# Patient Record
Sex: FEMALE | Race: BLACK OR AFRICAN AMERICAN | NOT HISPANIC OR LATINO | URBAN - METROPOLITAN AREA
[De-identification: names, ages, dates, MRNs, and addresses within clinical notes are randomized per-mention and may not be internally consistent; named-entity substitution may affect disease eponyms.]

---

## 2021-06-05 ENCOUNTER — EMERGENCY (EMERGENCY)
Facility: HOSPITAL | Age: 31
LOS: 0 days | Discharge: HOME | End: 2021-06-05
Attending: EMERGENCY MEDICINE | Admitting: EMERGENCY MEDICINE
Payer: MEDICAID

## 2021-06-05 VITALS
DIASTOLIC BLOOD PRESSURE: 77 MMHG | HEART RATE: 83 BPM | OXYGEN SATURATION: 100 % | HEIGHT: 68 IN | SYSTOLIC BLOOD PRESSURE: 126 MMHG | TEMPERATURE: 98 F | WEIGHT: 179.9 LBS | RESPIRATION RATE: 18 BRPM

## 2021-06-05 DIAGNOSIS — N89.8 OTHER SPECIFIED NONINFLAMMATORY DISORDERS OF VAGINA: ICD-10-CM

## 2021-06-05 LAB — HIV 1 & 2 AB SERPL IA.RAPID: SIGNIFICANT CHANGE UP

## 2021-06-05 PROCEDURE — 99284 EMERGENCY DEPT VISIT MOD MDM: CPT

## 2021-06-05 RX ORDER — CEFTRIAXONE 500 MG/1
500 INJECTION, POWDER, FOR SOLUTION INTRAMUSCULAR; INTRAVENOUS ONCE
Refills: 0 | Status: COMPLETED | OUTPATIENT
Start: 2021-06-05 | End: 2021-06-05

## 2021-06-05 RX ORDER — AZITHROMYCIN 500 MG/1
1 TABLET, FILM COATED ORAL ONCE
Refills: 0 | Status: COMPLETED | OUTPATIENT
Start: 2021-06-05 | End: 2021-06-05

## 2021-06-05 RX ADMIN — CEFTRIAXONE 500 MILLIGRAM(S): 500 INJECTION, POWDER, FOR SOLUTION INTRAMUSCULAR; INTRAVENOUS at 11:59

## 2021-06-05 RX ADMIN — AZITHROMYCIN 1 GRAM(S): 500 TABLET, FILM COATED ORAL at 11:59

## 2021-06-05 NOTE — ED PROVIDER NOTE - NSFOLLOWUPINSTRUCTIONS_ED_ALL_ED_FT
VAGINAL DISCHARGE - AfterCare(R) Instructions(ER/ED)     Vaginal Discharge    WHAT YOU NEED TO KNOW:    Vaginal discharge is normal. It is usually clear or white and odorless. Vaginal discharge is your body's way of cleaning your vagina so it is healthy. Irritation, itching, burning, or a change in the amount, smell, or color may indicate a problem.    DISCHARGE INSTRUCTIONS:    Contact your healthcare provider or gynecologist if:     You have swelling, burning, itching, or irritation in or around your vagina.      You have an increase in the amount of discharge.      The color or smell of your discharge changes.      Your discharge looks similar to cottage cheese.      Your discharge is bloody and it is not your monthly period.      You have pain during sexual intercourse.      You have trouble urinating, or you urinate often and with urgency.      You have abdominal pain or cramps.      You have a fever or chills.      You have low back pain or side pain.      You have questions or concerns about your condition or care.    Keep your vagina healthy:     Always wipe from front to back after you use the toilet. This prevents spreading bacteria from your rectal area into your vagina.       Clean in and around your vagina with mild soap and warm water each day. Gently dry the area after washing. Do not use hot tubs. The heat and moisture from hot tubs can increase your risk for another yeast infection.      Do not wear tight-fitting clothes or undergarments for long periods. Wear cotton underwear during the day. Cotton helps keep your genital area dry and does not hold in warmth or moisture. Do not wear underwear at night.      Change your laundry soap or fabric softener if you think it is irritating your skin.      Do not douche or use feminine hygiene sprays or bubble bath. Do not use pads or tampons that are scented, or colored or perfumed toilet paper.      Ask your healthcare provider about birth control options if necessary. Condoms have latex and diaphragms have gel that kill sperm. Both of these may irritate your genital area.    Follow up with your healthcare provider as directed: Write down your questions so you remember to ask them during your visits.        © Copyright [employer] 2019 All illustrations and images included in CareNotes are the copyrighted property of [employer] or [employer].

## 2021-06-05 NOTE — ED PROVIDER NOTE - CLINICAL SUMMARY MEDICAL DECISION MAKING FREE TEXT BOX
29 yo female without any significant PMH her " to be checked by a doctor".  She just recently moved to  from [place] and would like to establish medical care here/.  She reports vaginal irritation/d/c after sexual activity with her boyfriend; doen not always use protection.  Denies any abnormal vaginal bleeding, pelvic or abdominal pain, no dysuria/frequency or urgency, no fever chills, flank pain or any other additional complaints.   Well-appearing well-nourished young female in  NAD, head AT/NC, PERRL, pink conjunctivae,  nml phonation , supple neck without midline spine ttp, nml work of breathing, lungs CTA b/l, equal air entry, RRR, well-perfused extremities, distal pulses intact, abdomen soft, NT/ND, BS present in all quadrants, no midline spine or CVA ttp, no leg edema or unilateral calf swelling, skin nml color and temp.   exam as documented by PA , A&Ox3, no focal neuro deficits, nml mood and affect.  Will treat for STI, advised to f/w in OB-GYN clinic to establish care there.  Strict return precautions given.  Patient verbalized understanding and is amenable with the plan. 31 yo female without any significant PMH her " to be checked by a doctor".  She just recently moved to  from [place] and would like to establish medical care here/.  She reports vaginal irritation/d/c after sexual activity with her boyfriend; doen not always use protection.  Denies any abnormal vaginal bleeding, pelvic or abdominal pain, no dysuria/frequency or urgency, no fever chills, flank pain or any other additional complaints.   Well-appearing well-nourished young female in  NAD, head AT/NC, PERRL, pink conjunctivae,  nml phonation , supple neck without midline spine ttp, nml work of breathing, lungs CTA b/l, equal air entry, RRR, well-perfused extremities, distal pulses intact, abdomen soft, NT/ND, BS present in all quadrants, no midline spine or CVA ttp, no leg edema or unilateral calf swelling, skin nml color and temp.   exam as documented by PA , A&Ox3, no focal neuro deficits, nml mood and affect.  Will treat for STI, advised to f/w in OB-GYN clinic to establish care there.  Strict return precautions given.  Patient verbalized understanding and is amenable with the plan..

## 2021-06-05 NOTE — ED PROVIDER NOTE - NS ED ROS FT
Constitutional: (-) fever, (-) chills  Eyes: (-) visual changes  ENT: (-) nasal congestions  Cardiovascular: (-) chest pain, (-) syncope  Respiratory: (-) cough, (-) shortness of breath, (-) dyspnea,   Gastrointestinal: (-) vomiting, (-) diarrhea, (-)nausea,  Musculoskeletal: (-) neck pain, (-) back pain, (-) joint pain,  Integumentary: (-) rash  Peripheral Vascular: (-) leg swelling  :  (-)dysuria,  (-) hematuria, (+) vaginal irritation   Allergic/Immunologic: (-) pruritus

## 2021-06-05 NOTE — ED PROVIDER NOTE - NSFOLLOWUPCLINICS_GEN_ALL_ED_FT
[facility]  OB/GYN  [address] [place] [address]  Phone: [phone]  Fax:   Follow Up Time: 1-3 Days

## 2021-06-05 NOTE — ED PROVIDER NOTE - PHYSICAL EXAMINATION
Physical Exam    Vital Signs: I have reviewed the initial vital signs.  Constitutional: well-nourished, appears stated age, no acute distress  Eyes: Conjunctiva pink, Sclera clear,  Cardiovascular: S1 and S2, regular rate, regular rhythm, well-perfused extremities, radial pulses equal and 2+  Respiratory: unlabored respiratory effort, clear to auscultation bilaterally no wheezing, rales and rhonchi  Gastrointestinal: soft, non-tender abdomen, no pulsatile mass, normal bowl sounds  : chaperoned with rn, no external lesions or vesicles, cervix with white d/c, no cmt.   Musculoskeletal: supple neck, no lower extremity edema, no midline tenderness  Integumentary: warm, dry, no rash  Neurologic: awake, alert, nvi

## 2021-06-05 NOTE — ED ADULT TRIAGE NOTE - PATIENT ON (OXYGEN DELIVERY METHOD)
From: Jaquelin Hou  To: Jess Varela MD  Sent: 1/19/2018 11:56 AM CST  Subject: BP readings    Dear Dr Varela, My BP readings are: 162/74 yesterday at 8 PM; 138/62 today at 11:30 AM. Maybe there is hope for me! I will start on the Lamictal extended release tonight. That sure was serendipitous that you should suggest that at my visit. THANK YOU! Thank you also for the therapeutic visit. Have you read the book \"My [place]\" by former [place] governor Reji Hicks about his experience with his wife's Alzheimer's disease?   I told you that Branden was doing okay. She's actually is doing good. Very motivated to do her exercises. Her mood and attitude are good too which makes for an enjoyable  for me. Her interests are reading and watching sports and hearing all the details of my experiences.   room air

## 2021-06-05 NOTE — ED PROVIDER NOTE - OBJECTIVE STATEMENT
29 yo female, no pmh, presents to ed for vaginal irritation, states had unprotected sex with boyfriend only a few days ago, now has vaginal irritation and white d/c. no specific pain or radiation. denies fevers, dysuria, hematuria.

## 2021-06-05 NOTE — ED PROVIDER NOTE - PATIENT PORTAL LINK FT
You can access the FollowMyHealth Patient Portal offered by [facility] by registering at the following website: [email or web address]. By joining [employer]’s FollowMyHealth portal, you will also be able to view your health information using other applications (apps) compatible with our system.

## 2021-06-05 NOTE — ED PROVIDER NOTE - ATTENDING CONTRIBUTION TO CARE
29 yo female without any significant PMH her " to be checked by a doctor".  She just recently moved to  from [place] and would like to establish medical care here/.  She reports vaginal irritation/d/c after sexual activity with her boyfriend; doen not always use protection.  Denies any abnormal vaginal bleeding, pelvic or abdominal pain, no dysuria/frequency or urgency, no fever chills, flank pain or any other additional complaints.   Well-appearing well-nourished young female in  NAD, head AT/NC, PERRL, pink conjunctivae,  nml phonation , supple neck without midline spine ttp, nml work of breathing, lungs CTA b/l, equal air entry, RRR, well-perfused extremities, distal pulses intact, abdomen soft, NT/ND, BS present in all quadrants, no midline spine or CVA ttp, no leg edema or unilateral calf swelling, skin nml color and temp.   exam as documented by PA , A&Ox3, no focal neuro deficits, nml mood and affect.  Will treat for STI, advised to f/w in OB-GYN clinic to establish care there.  Strict return precautions given.  Patient verbalized understanding and is amenable with the plan.

## 2021-06-07 LAB
C TRACH RRNA SPEC QL NAA+PROBE: SIGNIFICANT CHANGE UP
N GONORRHOEA RRNA SPEC QL NAA+PROBE: SIGNIFICANT CHANGE UP
SPECIMEN SOURCE: SIGNIFICANT CHANGE UP

## 2021-08-18 ENCOUNTER — TRANSCRIPTION ENCOUNTER (OUTPATIENT)
Age: 31
End: 2021-08-18

## 2021-09-02 ENCOUNTER — TRANSCRIPTION ENCOUNTER (OUTPATIENT)
Age: 31
End: 2021-09-02

## 2021-09-04 ENCOUNTER — EMERGENCY (EMERGENCY)
Facility: HOSPITAL | Age: 31
LOS: 0 days | Discharge: HOME | End: 2021-09-04
Attending: EMERGENCY MEDICINE | Admitting: EMERGENCY MEDICINE
Payer: MEDICAID

## 2021-09-04 VITALS
SYSTOLIC BLOOD PRESSURE: 132 MMHG | OXYGEN SATURATION: 99 % | TEMPERATURE: 98 F | HEIGHT: 68 IN | DIASTOLIC BLOOD PRESSURE: 82 MMHG | HEART RATE: 78 BPM | RESPIRATION RATE: 18 BRPM | WEIGHT: 177.91 LBS

## 2021-09-04 VITALS — WEIGHT: 210.1 LBS

## 2021-09-04 DIAGNOSIS — J45.909 UNSPECIFIED ASTHMA, UNCOMPLICATED: ICD-10-CM

## 2021-09-04 DIAGNOSIS — L29.2 PRURITUS VULVAE: ICD-10-CM

## 2021-09-04 DIAGNOSIS — N89.8 OTHER SPECIFIED NONINFLAMMATORY DISORDERS OF VAGINA: ICD-10-CM

## 2021-09-04 LAB
APPEARANCE UR: CLEAR — SIGNIFICANT CHANGE UP
BILIRUB UR-MCNC: NEGATIVE — SIGNIFICANT CHANGE UP
COLOR SPEC: COLORLESS — SIGNIFICANT CHANGE UP
DIFF PNL FLD: NEGATIVE — SIGNIFICANT CHANGE UP
GLUCOSE UR QL: NEGATIVE — SIGNIFICANT CHANGE UP
KETONES UR-MCNC: NEGATIVE — SIGNIFICANT CHANGE UP
LEUKOCYTE ESTERASE UR-ACNC: NEGATIVE — SIGNIFICANT CHANGE UP
NITRITE UR-MCNC: NEGATIVE — SIGNIFICANT CHANGE UP
PH UR: 7 — SIGNIFICANT CHANGE UP (ref 5–8)
PROT UR-MCNC: NEGATIVE — SIGNIFICANT CHANGE UP
SP GR SPEC: 1.01 — LOW (ref 1.01–1.03)
UROBILINOGEN FLD QL: SIGNIFICANT CHANGE UP

## 2021-09-04 PROCEDURE — 99284 EMERGENCY DEPT VISIT MOD MDM: CPT

## 2021-09-04 RX ORDER — CEFTRIAXONE 500 MG/1
500 INJECTION, POWDER, FOR SOLUTION INTRAMUSCULAR; INTRAVENOUS ONCE
Refills: 0 | Status: COMPLETED | OUTPATIENT
Start: 2021-09-04 | End: 2021-09-04

## 2021-09-04 RX ORDER — MICONAZOLE NITRATE 2 %
1 CREAM (GRAM) TOPICAL
Qty: 1 | Refills: 0
Start: 2021-09-04 | End: 2021-09-10

## 2021-09-04 RX ORDER — FLUCONAZOLE 150 MG/1
1 TABLET ORAL
Qty: 1 | Refills: 0
Start: 2021-09-04 | End: 2021-09-04

## 2021-09-04 RX ORDER — FLUCONAZOLE 150 MG/1
1 TABLET ORAL
Qty: 1 | Refills: 0
Start: 2021-09-04 | End: 2022-01-03

## 2021-09-04 RX ADMIN — CEFTRIAXONE 500 MILLIGRAM(S): 500 INJECTION, POWDER, FOR SOLUTION INTRAMUSCULAR; INTRAVENOUS at 15:39

## 2021-09-04 NOTE — ED PROVIDER NOTE - NSFOLLOWUPINSTRUCTIONS_ED_ALL_ED_FT
Please take medications as prescribed. Please follow up with primary care doctor, return to the ED if symptoms worsen. thank you.

## 2021-09-04 NOTE — ED PROVIDER NOTE - CLINICAL SUMMARY MEDICAL DECISION MAKING FREE TEXT BOX
Patient presented with vaginal itching s/p using boyfriend's soap. Otherwise afebrile, HD stable, well appearing. Requesting fluconazole, monistat and STD testing/ppx. Obtained UA which was negative for infection, GC chlamydia sent and patient given ppx. Given hx, likely local reaction to the soap. WIll discharge home with outpatient follow up. Patient agreeable with plan. Agrees to return to ED for any new or worsening symptoms.

## 2021-09-04 NOTE — ED PROVIDER NOTE - PATIENT PORTAL LINK FT
You can access the FollowMyHealth Patient Portal offered by  by registering at the following website: [email or web address]. By joining [employer]’s FollowMyHealth portal, you will also be able to view your health information using other applications (apps) compatible with our system.

## 2021-09-04 NOTE — ED PROVIDER NOTE - PHYSICAL EXAMINATION
VITAL SIGNS: I have reviewed nursing notes and confirm.  CONSTITUTIONAL: Well-developed; well-nourished; in no acute distress.  SKIN: Skin exam is warm and dry, no acute rash. No petechiae  HEAD: Normocephalic; atraumatic.  NECK: No meningeal signs, full ROM, supple, non-tender  EYES: PERRL, EOM intact; conjunctiva and sclera clear.  ENT: No nasal discharge; airway clear. TMs clear. No exudate, petechiae or significant erythema.  CARD: S1, S2 normal; no murmurs, gallops, or rubs. Regular rate and rhythm.  RESP: No wheezes, rales or rhonchi.  ABD: Normal bowel sounds; soft; non-distended; non-tender; no hepatosplenomegaly.  : No lesions  EXT: Normal ROM. No clubbing, cyanosis or edema.  LYMPH: No acute cervical adenopathy.  NEURO: Grossly unremarkable. No focal deficits.  PSYCH: Cooperative, appropriate.

## 2021-09-04 NOTE — ED PROVIDER NOTE - OBJECTIVE STATEMENT
A CT Chest with contrast was entered for this patient with Lillie Amato CMA as the ordering provider.  Please replace this order using the correct provider.  Thanks!     31 y/o F with pmh of asthma who presents with vaginal itching. The pt states that yesterday she used her boyfriends Dove brand soap on her vaginal area and after using it, she began is itch in her vaginal area profusely. The pt does not feel the urge to itch today. This occurred to the pt before when using other brands of soaps besides (Dual Brand) and she stated that as a result she sought medical assistance. At an unnamed medical facility, they gave her flagyl. The pt denies having vaginal discharge, bleeding, dysuria, and hematuria. The pt's LMP was Tuesday. The pt is monogamous with her boyfriend and uses condoms on a consistent basis. The pt seeks is concerned about her vaginal itch and came today seeking STD testing and prophylactic treatment. 29 y/o F with pmh of asthma who presents with vaginal itching. The pt states that yesterday she used her boyfriend's soap on her vaginal area and after using it, she began to experience severe itching to vaginal area. This occurred to the pt before when using other brands of soaps, has been given flagyl in the past. The pt denies having vaginal discharge, bleeding, dysuria, and hematuria. Pt. also requesting STD testing today.

## 2021-09-04 NOTE — ED PROVIDER NOTE - NS ED ROS FT
Eyes:  No visual changes, eye pain or discharge.  ENMT:  No hearing changes, pain, discharge or infections. No neck pain or stiffness.  Cardiac:  No chest pain, SOB or edema. No chest pain with exertion.  Respiratory:  No cough or respiratory distress. No hemoptysis. No history of asthma or RAD.  GI:  No nausea, vomiting, diarrhea, or abdominal pain.  :  No dysuria, frequency or burning. +vaginal itching.  MS:  No myalgia, muscle weakness, joint pain, or back pain.  Neuro:  No headache or weakness.  No LOC.

## 2021-09-04 NOTE — ED ADULT NURSE NOTE - NSIMPLEMENTINTERV_GEN_ALL_ED
Implemented All Universal Safety Interventions:  [place] to call system. Call bell, personal items and telephone within reach. Instruct patient to call for assistance. Room bathroom lighting operational. Non-slip footwear when patient is off stretcher. Physically safe environment: no spills, clutter or unnecessary equipment. Stretcher in lowest position, wheels locked, appropriate side rails in place.

## 2021-09-04 NOTE — ED PROVIDER NOTE - ATTENDING CONTRIBUTION TO CARE
30 year old female, pmhx as documented presenting with vaginal itching s/p using her boyfriend's soap. States she has had a similar reaction to his soap in the past which resolved with fluconazle and monistat, which she is requesting. Also requesting STD testing and prophylaxis. Sexually active with 1 male partner, uses protection. Denies urinary symptoms, abd pain, N/V, vaginal bleeding/discharge or other complaints.    Vital Signs: I have reviewed the initial vital signs.  Constitutional: NAD, well-nourished, appears stated age, no acute distress.  HEENT: Airway patent, moist MM, no erythema/swelling/deformity of oral structures. EOMI, PERRLA.  CV: regular rate, regular rhythm, well-perfused extremities, 2+ b/l DP and radial pulses equal.  Lungs: BCTA, no increased WOB.  ABD: NTND, no guarding or rebound, no pulsatile mass, no hernias.   MSK: Neck supple, nontender, nl ROM, no stepoff. Chest nontender. Back nontender in TLS spine or to b/l bony structures or flanks. Ext nontender, nl rom, no deformity.   INTEG: Skin warm, dry, no rash.  NEURO: A&Ox3, normal strength, nl sensation throughout, normal speech.   PSYCH: Calm, cooperative, normal affect and interaction.    Will obtain UA, GC chlamydia, give STD prophylaxis, upreg, re-eval.

## 2021-09-04 NOTE — ED ADULT NURSE NOTE - OBJECTIVE STATEMENT
pt presents with vaginal itching and irritation started yesterday. pt had her period over the last week that's why she is unable to tell if she has any discharges. pt is currently sexually active. pt denies abd pain

## 2021-09-06 LAB
C TRACH RRNA SPEC QL NAA+PROBE: SIGNIFICANT CHANGE UP
CULTURE RESULTS: SIGNIFICANT CHANGE UP
N GONORRHOEA RRNA SPEC QL NAA+PROBE: SIGNIFICANT CHANGE UP
SPECIMEN SOURCE: SIGNIFICANT CHANGE UP

## 2021-10-29 ENCOUNTER — OUTPATIENT (OUTPATIENT)
Dept: OUTPATIENT SERVICES | Facility: HOSPITAL | Age: 31
LOS: 1 days | Discharge: HOME | End: 2021-10-29

## 2021-10-29 PROBLEM — Z78.9 OTHER SPECIFIED HEALTH STATUS: Chronic | Status: ACTIVE | Noted: 2021-09-04

## 2021-12-27 ENCOUNTER — EMERGENCY (EMERGENCY)
Facility: HOSPITAL | Age: 31
LOS: 0 days | Discharge: HOME | End: 2021-12-27
Attending: EMERGENCY MEDICINE | Admitting: EMERGENCY MEDICINE
Payer: MEDICAID

## 2021-12-27 VITALS
SYSTOLIC BLOOD PRESSURE: 119 MMHG | TEMPERATURE: 97 F | RESPIRATION RATE: 20 BRPM | HEART RATE: 78 BPM | OXYGEN SATURATION: 96 % | DIASTOLIC BLOOD PRESSURE: 59 MMHG

## 2021-12-27 VITALS — HEIGHT: 68 IN

## 2021-12-27 DIAGNOSIS — R53.1 WEAKNESS: ICD-10-CM

## 2021-12-27 DIAGNOSIS — J02.9 ACUTE PHARYNGITIS, UNSPECIFIED: ICD-10-CM

## 2021-12-27 DIAGNOSIS — R55 SYNCOPE AND COLLAPSE: ICD-10-CM

## 2021-12-27 DIAGNOSIS — U07.1 COVID-19: ICD-10-CM

## 2021-12-27 LAB
ALBUMIN SERPL ELPH-MCNC: 4 G/DL — SIGNIFICANT CHANGE UP (ref 3.5–5.2)
ALP SERPL-CCNC: 89 U/L — SIGNIFICANT CHANGE UP (ref 30–115)
ALT FLD-CCNC: 9 U/L — SIGNIFICANT CHANGE UP (ref 0–41)
ANION GAP SERPL CALC-SCNC: 12 MMOL/L — SIGNIFICANT CHANGE UP (ref 7–14)
AST SERPL-CCNC: 10 U/L — SIGNIFICANT CHANGE UP (ref 0–41)
BASOPHILS # BLD AUTO: 0.04 K/UL — SIGNIFICANT CHANGE UP (ref 0–0.2)
BASOPHILS NFR BLD AUTO: 0.5 % — SIGNIFICANT CHANGE UP (ref 0–1)
BILIRUB SERPL-MCNC: <0.2 MG/DL — SIGNIFICANT CHANGE UP (ref 0.2–1.2)
BUN SERPL-MCNC: 12 MG/DL — SIGNIFICANT CHANGE UP (ref 10–20)
CALCIUM SERPL-MCNC: 10.8 MG/DL — HIGH (ref 8.5–10.1)
CHLORIDE SERPL-SCNC: 103 MMOL/L — SIGNIFICANT CHANGE UP (ref 98–110)
CO2 SERPL-SCNC: 21 MMOL/L — SIGNIFICANT CHANGE UP (ref 17–32)
CREAT SERPL-MCNC: 0.8 MG/DL — SIGNIFICANT CHANGE UP (ref 0.7–1.5)
EOSINOPHIL # BLD AUTO: 0.16 K/UL — SIGNIFICANT CHANGE UP (ref 0–0.7)
EOSINOPHIL NFR BLD AUTO: 1.9 % — SIGNIFICANT CHANGE UP (ref 0–8)
GLUCOSE SERPL-MCNC: 99 MG/DL — SIGNIFICANT CHANGE UP (ref 70–99)
HCG SERPL QL: NEGATIVE — SIGNIFICANT CHANGE UP
HCT VFR BLD CALC: 42.3 % — SIGNIFICANT CHANGE UP (ref 37–47)
HGB BLD-MCNC: 13.3 G/DL — SIGNIFICANT CHANGE UP (ref 12–16)
IMM GRANULOCYTES NFR BLD AUTO: 0.4 % — HIGH (ref 0.1–0.3)
LYMPHOCYTES # BLD AUTO: 2.57 K/UL — SIGNIFICANT CHANGE UP (ref 1.2–3.4)
LYMPHOCYTES # BLD AUTO: 30.3 % — SIGNIFICANT CHANGE UP (ref 20.5–51.1)
MCHC RBC-ENTMCNC: 29.7 PG — SIGNIFICANT CHANGE UP (ref 27–31)
MCHC RBC-ENTMCNC: 31.4 G/DL — LOW (ref 32–37)
MCV RBC AUTO: 94.4 FL — SIGNIFICANT CHANGE UP (ref 81–99)
MONOCYTES # BLD AUTO: 0.48 K/UL — SIGNIFICANT CHANGE UP (ref 0.1–0.6)
MONOCYTES NFR BLD AUTO: 5.7 % — SIGNIFICANT CHANGE UP (ref 1.7–9.3)
NEUTROPHILS # BLD AUTO: 5.2 K/UL — SIGNIFICANT CHANGE UP (ref 1.4–6.5)
NEUTROPHILS NFR BLD AUTO: 61.2 % — SIGNIFICANT CHANGE UP (ref 42.2–75.2)
NRBC # BLD: 0 /100 WBCS — SIGNIFICANT CHANGE UP (ref 0–0)
PLATELET # BLD AUTO: 365 K/UL — SIGNIFICANT CHANGE UP (ref 130–400)
POTASSIUM SERPL-MCNC: 4.7 MMOL/L — SIGNIFICANT CHANGE UP (ref 3.5–5)
POTASSIUM SERPL-SCNC: 4.7 MMOL/L — SIGNIFICANT CHANGE UP (ref 3.5–5)
PROT SERPL-MCNC: 6.7 G/DL — SIGNIFICANT CHANGE UP (ref 6–8)
RBC # BLD: 4.48 M/UL — SIGNIFICANT CHANGE UP (ref 4.2–5.4)
RBC # FLD: 12.7 % — SIGNIFICANT CHANGE UP (ref 11.5–14.5)
SODIUM SERPL-SCNC: 136 MMOL/L — SIGNIFICANT CHANGE UP (ref 135–146)
TROPONIN T SERPL-MCNC: <0.01 NG/ML — SIGNIFICANT CHANGE UP
WBC # BLD: 8.48 K/UL — SIGNIFICANT CHANGE UP (ref 4.8–10.8)
WBC # FLD AUTO: 8.48 K/UL — SIGNIFICANT CHANGE UP (ref 4.8–10.8)

## 2021-12-27 PROCEDURE — 71045 X-RAY EXAM CHEST 1 VIEW: CPT | Mod: 26

## 2021-12-27 PROCEDURE — 99285 EMERGENCY DEPT VISIT HI MDM: CPT

## 2021-12-27 PROCEDURE — 93010 ELECTROCARDIOGRAM REPORT: CPT

## 2021-12-27 RX ORDER — SODIUM CHLORIDE 9 MG/ML
1000 INJECTION, SOLUTION INTRAVENOUS ONCE
Refills: 0 | Status: COMPLETED | OUTPATIENT
Start: 2021-12-27 | End: 2021-12-27

## 2021-12-27 RX ADMIN — SODIUM CHLORIDE 1000 MILLILITER(S): 9 INJECTION, SOLUTION INTRAVENOUS at 17:04

## 2021-12-27 NOTE — ED PROVIDER NOTE - WET READ LAUNCH FT
There are no Wet Read(s) to document. Otezla Counseling: The side effects of Otezla were discussed with the patient, including but not limited to worsening or new depression, weight loss, diarrhea, nausea, upper respiratory tract infection, and headache. Patient instructed to call the office should any adverse effect occur.  The patient verbalized understanding of the proper use and possible adverse effects of Otezla.  All the patient's questions and concerns were addressed.

## 2021-12-27 NOTE — ED PROVIDER NOTE - CLINICAL SUMMARY MEDICAL DECISION MAKING FREE TEXT BOX
pt here with syncopal episode yesterday likely in setting of viral illness, ekg nsr, pt well appearing, screening labs grossly wnl, dc home

## 2021-12-27 NOTE — ED PROVIDER NOTE - NSCAREINITIATED _GEN_ER
[address]  Progress Note    CHIEF COMPLAINT:  Patient presents with:  Neck Pain: LOV 07/27/20 Pt states that hes following up, he still has the neck pain and it has gotten a little better.  Pt denies any new Comment: advil 3 - 4 per day      Sexual activity: Not on file      FAMILY HISTORY:   Family History   Problem Relation Age of Onset   • Cancer Father         leukemia   • Other (Other) Son         rheumatoid arthritis       CURRENT MEDICATIONS:   Current Kiersten Manzanares(Resident) follow 2 step commands, comprehention intact, spontaneous speech intact  Strength: Upper extremities have 5/5 strength  Sensation: Normal upper extremities  Reflexes: Normal upper extremities, no hyperreflexia  Spurling's sign: neg     Data     Radiology I

## 2021-12-27 NOTE — ED PROVIDER NOTE - PATIENT PORTAL LINK FT
You can access the FollowMyHealth Patient Portal offered by [facility] by registering at the following website: [email or web address]. By joining [employer]’s FollowMyHealth portal, you will also be able to view your health information using other applications (apps) compatible with our system.

## 2021-12-27 NOTE — ED PROVIDER NOTE - PROGRESS NOTE DETAILS
pmh: asthma  Symptoms: sore throat x1 week, runny nose, presyncope ATTENDING NOTE:  32 y/o F, No PMHX, here or evaluation of sore throat x3 days. Pt reports weakness as well as passing out while standing. Last LMP 2 weeks ago.   On exam: CON: ao x 3, HENMT: clear oropharynx, neck supple,  CV: rrr, equal pulses b/l, RESP: cta b/l, GI:  soft, nontender, no rebound, no guarding, SKIN: no rash, MSK: no deformities, NEURO: no gross motor or sensory deficit Psychiatric: appropriate mood, appropriate affect.  Plan for labs and fluid. ATTENDING NOTE:  30 y/o F, No PMHX, here or evaluation of sore throat x3 days. Pt reports weakness as well as passing out while standing. Last LMP 2 weeks ago.   On exam: CON: ao x 3, HENMT: clear oropharynx, neck supple,  CV: rrr, equal pulses b/l, RESP: cta b/l, GI:  soft, nontender, no rebound, no guarding, SKIN: no rash, MSK: no deformities, NEURO: no gross motor or sensory deficit Psychiatric: appropriate mood, appropriate affect.  Plan syncope, yesterday, plan for labs and fluid.

## 2021-12-27 NOTE — ED PROVIDER NOTE - NSFOLLOWUPINSTRUCTIONS_ED_ALL_ED_FT
COVID-19 (Coronavirus Disease 2019)    WHAT YOU NEED TO KNOW:    COVID-19 is the disease caused by a coronavirus first discovered in December 2019. Coronaviruses generally cause upper respiratory (nose, throat, and lung) infections, such as a cold. The 2019 virus spreads quickly and easily. It can be spread starting 2 to 3 days before symptoms even begin.    DISCHARGE INSTRUCTIONS:    Call your local emergency number (911 in the ) if:   •You have trouble breathing or shortness of breath at rest.      •You have chest pain or pressure that lasts longer than 5 minutes.      •You become confused or hard to wake.      •Your lips or face are blue.      Return to the emergency department if:   •You have a fever of 104°F (40°C) or higher.          Call your doctor if:   •You have symptoms of COVID-19.      •You have questions or concerns about your condition or care.      Medicines: You may need any of the following:   •Decongestants help reduce nasal congestion and help you breathe more easily. If you take decongestant pills, they may make you feel restless or cause problems with your sleep. Do not use decongestant sprays for more than a few days.      •Cough suppressants help reduce coughing. Ask your healthcare provider which type of cough medicine is best for you.      •Acetaminophen decreases pain and fever. It is available without a doctor's order. Ask how much to take and how often to take it. Follow directions. Read the labels of all other medicines you are using to see if they also contain acetaminophen, or ask your doctor or pharmacist. Acetaminophen can cause liver damage if not taken correctly. Do not use more than 4 grams (4,000 milligrams) total of acetaminophen in one day.       •NSAIDs, such as ibuprofen, help decrease swelling, pain, and fever. This medicine is available with or without a doctor's order. NSAIDs can cause stomach bleeding or kidney problems in certain people. If you take blood thinner medicine, always ask your healthcare provider if NSAIDs are safe for you. Always read the medicine label and follow directions.      •Blood thinners help prevent blood clots. Clots can cause strokes, heart attacks, and death. The following are general safety guidelines to follow while you are taking a blood thinner:?Watch for bleeding and bruising while you take blood thinners. Watch for bleeding from your gums or nose. Watch for blood in your urine and bowel movements. Use a soft washcloth on your skin, and a soft toothbrush to brush your teeth. This can keep your skin and gums from bleeding. If you shave, use an electric shaver. Do not play contact sports.       ?Tell your dentist and other healthcare providers that you take a blood thinner. Wear a bracelet or necklace that says you take this medicine.       ?Do not start or stop any other medicines unless your healthcare provider tells you to. Many medicines cannot be used with blood thinners.      ?Take your blood thinner exactly as prescribed by your healthcare provider. Do not skip does or take less than prescribed. Tell your provider right away if you forget to take your blood thinner, or if you take too much.      ?Warfarin is a blood thinner that you may need to take. The following are things you should be aware of if you take warfarin: ?Foods and medicines can affect the amount of warfarin in your blood. Do not make major changes to your diet while you take warfarin. Warfarin works best when you eat about the same amount of vitamin K every day. Vitamin K is found in green leafy vegetables and certain other foods. Ask for more information about what to eat when you are taking warfarin.      ?You will need to see your healthcare provider for follow-up visits when you are on warfarin. You will need regular blood tests. These tests are used to decide how much medicine you need.         •Take your medicine as directed. Contact your healthcare provider if you think your medicine is not helping or if you have side effects. Tell him or her if you are allergic to any medicine. Keep a list of the medicines, vitamins, and herbs you take. Include the amounts, and when and why you take them. Bring the list or the pill bottles to follow-up visits. Carry your medicine list with you in case of an emergency.      What you need to know about variants: The virus has changed into several new forms (called variants) since it was discovered. The variants may be more contagious (easily spread) than the original form. Some may also cause more severe illness than others.    What you need to know about COVID-19 vaccines: Healthcare providers recommend a COVID-19 vaccine, even if you have already had COVID-19. You are considered fully vaccinated against COVID-19 two weeks after the final dose of any COVID-19 vaccine. Let your healthcare provider know when you have received the final dose of the vaccine. Make a copy of your vaccination card. Keep the original with you in case you need to show it. Keep the copy in a safe place.  •COVID-19 vaccines are given as a shot in 1 or 2 doses. The vaccine is recommended for everyone 12 years or older.      •A third dose is recommended for adults with a weakened immune system who get a 2-dose vaccine. The third dose is given at least 28 days after the second.      •A booster (additional) dose is being recommended for other people as well. This is usually given 6 months after the initial doses are complete. Continue social distancing and other measures, even after you get the vaccine. Although it is not common, you can become infected after you get the vaccine. You may also be able to pass the virus to others without knowing you are infected.      •After you get the vaccine, check local, national, and international travel rules. You may need to be tested before you travel. Some countries require proof of a negative test before you travel. You may also need to quarantine after you return.      How the 2019 coronavirus spreads:   •Droplets are the main way all coronaviruses spread. The virus travels in droplets that form when a person talks, sings, coughs, or sneezes. The droplets can also float in the air for minutes or hours. Infection happens when you breathe in the droplets or get them in your eyes or nose. Close personal contact with an infected person increases your risk for infection. This means being within 6 feet (2 meters) of the person for at least 15 minutes over 24 hours.      •Person-to-person contact can spread the virus. For example, a person with the virus on his or her hands can spread it by shaking hands with someone.      •The virus can stay on objects and surfaces for up to 3 days. You may become infected by touching the object or surface and then touching your eyes or mouth.      Help lower the risk for COVID-19:   •Wash your hands often throughout the day. Use soap and water. Rub your soapy hands together, lacing your fingers, for at least 20 seconds. Rinse with warm, running water. Dry your hands with a clean towel or paper towel. Use hand  that contains alcohol if soap and water are not available. Teach children how to wash their hands and use hand .  Handwashing           •Cover sneezes and coughs. Turn your face away and cover your mouth and nose with a tissue. Throw the tissue away. Use the bend of your arm if a tissue is not available. Then wash your hands well with soap and water or use hand . Teach children how to cover a cough or sneeze.      •Wear a face covering (mask) when needed. Use a cloth covering with at least 2 layers. You can also create layers by putting a cloth covering over a disposable non-medical mask. Cover your mouth and your nose.  How to Wear a Face Covering (Mask)           •Follow worldwide, national, and local social distancing guidelines. Keep at least 6 feet (2 meters) between you and others.      •Try not to touch your face. If you get the virus on your hands, you can transfer it to your eyes, nose, or mouth and become infected. You can also transfer it to objects, surfaces, or people.      •Clean and disinfect high-touch surfaces and objects often. Use disinfecting wipes, or make a solution of 4 teaspoons of bleach in 1 quart (4 cups) of water.      •Ask about other vaccines you may need. Get the influenza (flu) vaccine as soon as recommended each year, usually starting in September or October. Get the pneumonia vaccine if recommended. Your healthcare provider can tell you if you should also get other vaccines, and when to get them.    Prevent COVID-19 Infection         Follow social distancing guidelines: National and local social distancing rules vary. Rules and restrictions may change over time as restrictions are lifted. The following are general guidelines:   •Stay home if you are sick or think you may have COVID-19. It is important to stay home if you are waiting for a testing appointment or for test results.      •Avoid close physical contact with anyone who does not live in your home. Do not shake hands with, hug, or kiss a person as a greeting. If you must use public transportation (such as a bus or subway), try to sit or stand away from others. Wear your face covering.      •Avoid in-person gatherings and crowds. Attend virtually if possible.      For more information:   •Centers for Disease Control and Prevention  [address] [place] [address]  Phone: 1-132.264.5759  Web Address: http://www.cdc.gov        Follow up with your doctor as directed: Write down your questions so you remember to ask them during your visits.

## 2021-12-27 NOTE — ED PROVIDER NOTE - OBJECTIVE STATEMENT
30 y/o F, No PMHX, here or evaluation of sore throat x3 days. Pt reports weakness as well as passing out while standing. Last LMP 2 weeks ago.

## 2021-12-27 NOTE — ED ADULT NURSE NOTE - BEFAST SPEECH PHRASE
Pt arrives to the ED with complaints of worsening depression. Pt states he smoked weed and took 3 xanax yesterday and still feels \"sleepy\" Pt states this was an attempt to end his life.    Yes

## 2021-12-28 LAB — SARS-COV-2 RNA SPEC QL NAA+PROBE: DETECTED

## 2022-01-03 ENCOUNTER — EMERGENCY (EMERGENCY)
Facility: HOSPITAL | Age: 32
LOS: 0 days | Discharge: HOME | End: 2022-01-03
Attending: EMERGENCY MEDICINE | Admitting: EMERGENCY MEDICINE
Payer: MEDICAID

## 2022-01-03 VITALS
DIASTOLIC BLOOD PRESSURE: 70 MMHG | TEMPERATURE: 97 F | RESPIRATION RATE: 18 BRPM | OXYGEN SATURATION: 96 % | HEART RATE: 90 BPM | HEIGHT: 68 IN | WEIGHT: 233.69 LBS | SYSTOLIC BLOOD PRESSURE: 125 MMHG

## 2022-01-03 DIAGNOSIS — N89.8 OTHER SPECIFIED NONINFLAMMATORY DISORDERS OF VAGINA: ICD-10-CM

## 2022-01-03 DIAGNOSIS — Z20.2 CONTACT WITH AND (SUSPECTED) EXPOSURE TO INFECTIONS WITH A PREDOMINANTLY SEXUAL MODE OF TRANSMISSION: ICD-10-CM

## 2022-01-03 LAB
APPEARANCE UR: CLEAR — SIGNIFICANT CHANGE UP
BACTERIA # UR AUTO: NEGATIVE — SIGNIFICANT CHANGE UP
BILIRUB UR-MCNC: NEGATIVE — SIGNIFICANT CHANGE UP
COLOR SPEC: YELLOW — SIGNIFICANT CHANGE UP
DIFF PNL FLD: NEGATIVE — SIGNIFICANT CHANGE UP
EPI CELLS # UR: 5 /HPF — SIGNIFICANT CHANGE UP (ref 0–5)
GLUCOSE UR QL: NEGATIVE — SIGNIFICANT CHANGE UP
HIV 1 & 2 AB SERPL IA.RAPID: SIGNIFICANT CHANGE UP
HYALINE CASTS # UR AUTO: 1 /LPF — SIGNIFICANT CHANGE UP (ref 0–7)
KETONES UR-MCNC: NEGATIVE — SIGNIFICANT CHANGE UP
LEUKOCYTE ESTERASE UR-ACNC: ABNORMAL
NITRITE UR-MCNC: NEGATIVE — SIGNIFICANT CHANGE UP
PH UR: 6 — SIGNIFICANT CHANGE UP (ref 5–8)
PROT UR-MCNC: SIGNIFICANT CHANGE UP
RBC CASTS # UR COMP ASSIST: 4 /HPF — SIGNIFICANT CHANGE UP (ref 0–4)
SP GR SPEC: 1.03 — SIGNIFICANT CHANGE UP (ref 1.01–1.03)
UROBILINOGEN FLD QL: ABNORMAL
WBC UR QL: 2 /HPF — SIGNIFICANT CHANGE UP (ref 0–5)

## 2022-01-03 PROCEDURE — 99284 EMERGENCY DEPT VISIT MOD MDM: CPT

## 2022-01-03 RX ORDER — CEFTRIAXONE 500 MG/1
500 INJECTION, POWDER, FOR SOLUTION INTRAMUSCULAR; INTRAVENOUS ONCE
Refills: 0 | Status: COMPLETED | OUTPATIENT
Start: 2022-01-03 | End: 2022-01-03

## 2022-01-03 RX ADMIN — CEFTRIAXONE 500 MILLIGRAM(S): 500 INJECTION, POWDER, FOR SOLUTION INTRAMUSCULAR; INTRAVENOUS at 15:44

## 2022-01-03 NOTE — ED PROVIDER NOTE - PATIENT PORTAL LINK FT
You can access the FollowMyHealth Patient Portal offered by [facility] by registering at the following website: [email or web address]. By joining [employer]’s FollowMyHealth portal, you will also be able to view your health information using other applications (apps) compatible with our system.

## 2022-01-03 NOTE — ED PROVIDER NOTE - PHYSICAL EXAMINATION
Physical Exam    Vital Signs: I have reviewed the initial vital signs.  Constitutional: well-nourished, appears stated age, no acute distress  Eyes: Conjunctiva pink, Sclera clear  Cardiovascular: S1 and S2, regular rate, regular rhythm, well-perfused extremities, radial pulses equal and 2+ b/l.   Respiratory: unlabored respiratory effort, clear to auscultation bilaterally no wheezing, rales and rhonchi. pt is speaking full sentences. no accessory muscle use.   Gastrointestinal: soft, non-tender, nondistended abdomen, no pulsatile mass, normal bowl sounds, no rebound, no guarding, no organomegaly. no cva tenderness.   Genitourinary: exam chaperoned by Dr. Mcghee. (+) white curd like vaginal discharge. no malodorous vaginal discharge. no vaginal bleeding. no cmt.   Musculoskeletal: FROM of b/l upper and lower extremities.   Integumentary: warm, dry, no rash  Neurologic: awake, alert, steady gait.   Psychiatric: appropriate mood, appropriate affect

## 2022-01-03 NOTE — ED PROVIDER NOTE - CLINICAL SUMMARY MEDICAL DECISION MAKING FREE TEXT BOX
30 y/o F presents to the ED with vaginal discharge. Pt has a history of unprotected sexual contact and is requesting STI testing. Pt also notes a history of BV. On exam ABD is soft NTND. Vaginal exam noted to have white cervical discharge, no adnexal tenderness or CMT. Obtain labs. Given IM Ceftriaxone. Labs revealed moderate LE. Sent Pt a prescription for prophylactic STI therapy and Diflucan for presumed bacterial vaginosis. Will discharge home. Advised Pt to avoid any further sexual intercourse. Pt verbally agrees with plan. Return precautions given.

## 2022-01-03 NOTE — ED PROVIDER NOTE - NS ED ROS FT
CONST: No fever, chills or bodyaches  EYES: No pain, redness, drainage or visual changes.  ENT: No ear pain or discharge, nasal discharge or congestion. No sore throat  CARD: No chest pain, palpitations  RESP: No SOB, cough, hemoptysis. No hx of asthma or COPD  GI: No abdominal pain, N/V/D  : (+) urinary frequency, white and yellow vaginal discharge.   MS: No joint pain, back pain or extremity pain/injury  SKIN: No rashes  NEURO: No headache, dizziness, paresthesias or LOC

## 2022-01-03 NOTE — ED PROVIDER NOTE - NSFOLLOWUPCLINICS_GEN_ALL_ED_FT
[facility]  OB/GYN  [address] [place] [address]  Phone: [phone]  Fax:   Follow Up Time: 4-6 Days

## 2022-01-03 NOTE — ED ADULT TRIAGE NOTE - CHIEF COMPLAINT QUOTE
Patient complaining of vaginal itch x2 days with cloudy white discharge. No pain on urination, but urinary frequency. +COVID as of 1/2

## 2022-01-03 NOTE — ED PROVIDER NOTE - NSFOLLOWUPINSTRUCTIONS_ED_ALL_ED_FT
What is bacterial vaginosis?  Bacterial vaginosis is an infection in the vagina that can cause bad-smelling vaginal discharge. "Vaginal discharge" is the term doctors and nurses use to describe any fluid that comes out of the vagina (figure 1). Some amount of vaginal discharge is normal. But people with bacterial vaginosis can have a lot of vaginal discharge, or vaginal discharge that smells bad.    Bacterial vaginosis is caused by certain bacteria (germs). The vagina normally has different types of bacteria in it. But when the amounts or the types of bacteria change, an infection can happen.    Bacterial vaginosis usually affects people who are, or have been, sexually active. Your risk of getting it increases if you have a new sex partner or more than one partner. This is true whether your partners are male or female.    If you have bacterial vaginosis, you have a higher chance of catching other infections that are spread through sex. You can lower this risk by using condoms when you have sex.    What are the symptoms of bacterial vaginosis?  Some people with bacterial vaginosis have no symptoms. When symptoms do happen, they often include a "fishy-smelling" vaginal discharge. The discharge is watery and off-white or gray. The smell might be more noticeable:    ?During your period    ?After sex with a male partner – This happens when semen (the fluid that is released during sex) mixes with your vaginal fluids.    You might also notice a burning feeling in your vagina.    Is there a test for bacterial vaginosis?  Yes. Your doctor or nurse will do an exam. They will also take a sample of your vaginal discharge, and do lab tests on it to look for an infection.    How is bacterial vaginosis treated?  Bacterial vaginosis is treated with medicine. The 2 medicines most often used are:    ?Metronidazole    ?Clindamycin    Both of these medicines come in different forms. They can come as a pill that you swallow or as a gel or cream that you put inside your vagina. Most people have fewer side effects when they use the gel or cream treatment. But you and your doctor or nurse will decide which medicine and which form is right for you.    It is important that you take all of the medicine your doctor or nurse prescribes, even if your symptoms go away after a few doses. Taking all of your medicine can help prevent the symptoms from coming back.    Do my sex partners need to be treated if I have bacterial vaginosis?  It depends. If your sex partner does not have a vagina, they do not need to be treated if you have bacterial vaginosis. But if your partner does have a vagina, you should tell them about your bacterial vaginosis. That way they can be treated.    What happens if my symptoms come back?  Once you have had bacterial vaginosis, it can come back, even if you are not having sex. If your symptoms come back, let your doctor or nurse know. You might need treatment with more medicine.    Some people get bacterial vaginosis over and over again. If this happens to you, your doctor might suggest taking medicine for 3 to 6 months. This might help to prevent future infections.    What if I am pregnant and have symptoms of bacterial vaginosis?  If you are pregnant and have symptoms of bacterial vaginosis, tell your doctor or nurse. You might need treatment with medicine.    Can bacterial vaginosis be prevented?  Sometimes. You can help prevent bacterial vaginosis by using condoms when you have sex.    You can also avoid things that increase the risk of bacterial vaginosis. For example:    ?Avoid douching (putting liquid inside your vagina to rinse it out)    ?Do not smoke, or try to quit if you already smoke    ?Avoid sharing sex toys, and clean toys between uses

## 2022-01-03 NOTE — ED PROVIDER NOTE - OBJECTIVE STATEMENT
32 y/o female with no significant PMH presents to the ED for evaluation of white to yellow vaginal discharge and vaginal itchiness that began yesterday. pt reports her LMP was about a week ago. pt reports urinary frequency. pt is sexually active with one male partner and she has is not sure if he has been faithful. pt reports she engaged in unprotected sex. pt would like std testing an ppx tx. pt denies burning or pain with urination, blood in the urine, back pain, fever, chills, malodorous vaginal discharge, hx of stds, abdominal pain, or n/v/d/c.

## 2022-01-04 LAB
C TRACH RRNA SPEC QL NAA+PROBE: SIGNIFICANT CHANGE UP
CULTURE RESULTS: SIGNIFICANT CHANGE UP
N GONORRHOEA RRNA SPEC QL NAA+PROBE: SIGNIFICANT CHANGE UP
SPECIMEN SOURCE: SIGNIFICANT CHANGE UP
SPECIMEN SOURCE: SIGNIFICANT CHANGE UP
T PALLIDUM AB TITR SER: NEGATIVE — SIGNIFICANT CHANGE UP

## 2022-01-13 ENCOUNTER — TRANSCRIPTION ENCOUNTER (OUTPATIENT)
Age: 32
End: 2022-01-13

## 2022-03-14 ENCOUNTER — TRANSCRIPTION ENCOUNTER (OUTPATIENT)
Age: 32
End: 2022-03-14

## 2022-03-20 ENCOUNTER — TRANSCRIPTION ENCOUNTER (OUTPATIENT)
Age: 32
End: 2022-03-20

## 2022-04-03 ENCOUNTER — TRANSCRIPTION ENCOUNTER (OUTPATIENT)
Age: 32
End: 2022-04-03

## 2022-05-17 ENCOUNTER — EMERGENCY (EMERGENCY)
Facility: HOSPITAL | Age: 32
LOS: 0 days | Discharge: HOME | End: 2022-05-17
Attending: STUDENT IN AN ORGANIZED HEALTH CARE EDUCATION/TRAINING PROGRAM | Admitting: STUDENT IN AN ORGANIZED HEALTH CARE EDUCATION/TRAINING PROGRAM
Payer: MEDICAID

## 2022-05-17 VITALS
HEART RATE: 82 BPM | OXYGEN SATURATION: 98 % | TEMPERATURE: 99 F | DIASTOLIC BLOOD PRESSURE: 85 MMHG | RESPIRATION RATE: 17 BRPM | SYSTOLIC BLOOD PRESSURE: 124 MMHG | HEIGHT: 68 IN

## 2022-05-17 DIAGNOSIS — J45.909 UNSPECIFIED ASTHMA, UNCOMPLICATED: ICD-10-CM

## 2022-05-17 DIAGNOSIS — Z76.0 ENCOUNTER FOR ISSUE OF REPEAT PRESCRIPTION: ICD-10-CM

## 2022-05-17 DIAGNOSIS — Z88.6 ALLERGY STATUS TO ANALGESIC AGENT: ICD-10-CM

## 2022-05-17 DIAGNOSIS — Z88.8 ALLERGY STATUS TO OTHER DRUGS, MEDICAMENTS AND BIOLOGICAL SUBSTANCES: ICD-10-CM

## 2022-05-17 DIAGNOSIS — R09.81 NASAL CONGESTION: ICD-10-CM

## 2022-05-17 DIAGNOSIS — J30.2 OTHER SEASONAL ALLERGIC RHINITIS: ICD-10-CM

## 2022-05-17 PROCEDURE — 99283 EMERGENCY DEPT VISIT LOW MDM: CPT

## 2022-05-17 RX ORDER — CETIRIZINE HYDROCHLORIDE, PSEUDOEPHEDRINE HYDROCHLORIDE 5; 120 MG/1; MG/1
1 TABLET, FILM COATED, EXTENDED RELEASE ORAL
Qty: 14 | Refills: 0
Start: 2022-05-17 | End: 2022-05-30

## 2022-05-17 NOTE — ED PROVIDER NOTE - OBJECTIVE STATEMENT
32 yo female with a pmh of asthma presents for medication refill. pt states that she has seasonal allergies that she takes zyrtec for but ran out. pt states to have nasal congestion. pt denies any other symptoms including fevers, chill, headache, recent illness/travel, cough, abdominal pain, chest pain, or SOB.

## 2022-05-17 NOTE — ED PROVIDER NOTE - PHYSICAL EXAMINATION
Gen: NAD, AOx3  Head: NCAT  HEENT: PERRL, oral mucosa moist, normal conjunctiva, oropharynx clear without exudate or erythema  Lung: CTAB, no respiratory distress, no wheezing, rales, rhonchi  CV: normal s1/s2, rrr, Normal perfusion, pulses 2+ throughout  MSK: No edema, no visible deformities, full range of motion in all 4 extremities  Neuro: CN II-XII grossly intact, No focal neurologic deficits  Skin: No rash   Psych: normal affect

## 2022-05-17 NOTE — ED PROVIDER NOTE - ATTENDING APP SHARED VISIT CONTRIBUTION OF CARE
32 yo f hx asthma  pt presents for med refill. pt states she takez zyrtec but ran out. pt endorses nasal congestion from seasonal allergies and wants refill. no f/c/cough/sob/wheeze    vss  gen- NAD, aaox3  card-rrr  lungs-ctab, no wheezing or rhonchi  neuro- full str/sensation, cn ii-xii grossly intact, normal coordination and gait

## 2022-05-17 NOTE — ED PROVIDER NOTE - PATIENT PORTAL LINK FT
You can access the FollowMyHealth Patient Portal offered by [facility] by registering at the following website: [email or web address]. By joining [employer]’s FollowMyHealth portal, you will also be able to view your health information using other applications (apps) compatible with our system.

## 2022-05-17 NOTE — ED PROVIDER NOTE - NS ED ATTENDING STATEMENT MOD
This was a shared visit with the MATILDA. I reviewed and verified the documentation and independently performed the documented:

## 2022-05-17 NOTE — ED ADULT NURSE NOTE - NS ED NURSE RECORD ANOTHER VITAL SIGN
Palliative Care Progress Note    Subjective  Patient seen this am.  Breakfast tray 100% consumed.  Patient sleeping.  She acknowledges me then returns to sleep.    No new events.   Patient offers no new symptoms/complaints  Objective      Visit Vitals  /64 (BP Location: LUE - Left upper extremity, Patient Position: Supine)   Pulse 81   Temp 97.5 °F (36.4 °C) (Oral)   Resp 16   Ht 5' 6\" (1.676 m)   Wt (!) 151.2 kg (333 lb 5.4 oz)   SpO2 100%   BMI 53.80 kg/m²       Physical Exam  Vitals signs and nursing note reviewed.   Constitutional:       Comments: somnolent   Cardiovascular:      Rate and Rhythm: Normal rate and regular rhythm.      Pulses: Normal pulses.      Heart sounds: Normal heart sounds.   Pulmonary:      Effort: Pulmonary effort is normal. No respiratory distress.      Breath sounds: Normal breath sounds.   Abdominal:      Palpations: Abdomen is soft.      Comments: Morbid obesity   Musculoskeletal:         General: Swelling present.      Comments: Left BKA   Skin:     General: Skin is warm.   Neurological:      Mental Status: Mental status is at baseline.         Labs   CBC  Recent Labs   Lab 09/10/20  0420 09/09/20  0530 09/08/20  2130 09/08/20  0340  09/04/20  1445 09/04/20  0330 09/03/20  0326   WBC 9.1 9.9  --  8.9   < > 7.9 7.4 7.3   RBC 2.89* 3.05*  --  2.61*   < > 2.67* 2.65* 2.70*   HGB 8.0* 8.5* 8.7* 7.0*   < > 7.3* 7.1* 7.3*   HCT 27.1* 28.2* 30.0* 24.4*   < > 25.3* 25.0* 25.8*   MCV 93.8 92.5  --  93.5   < > 94.8 94.3 95.6   MCH 27.7 27.9  --  26.8   < > 27.3 26.8 27.0   MCHC 29.5* 30.1*  --  28.7*   < > 28.9* 28.4* 28.3*   RDW-CV 16.4* 16.3*  --  16.7*   < > 16.7* 16.7* 16.8*    356  --  310   < > 307 259 265   LYMPH  --   --   --   --   --  15 20 20    < > = values in this interval not displayed.     CMP  Recent Labs   Lab 09/10/20  0420 09/09/20  0530 09/08/20  0340 09/07/20  0925   Sodium 130* 130* 132* 133*   Chloride 94* 94* 96* 98   BUN 74* 66* 60* 55*   GFR Estimate,   23 25 26 26   BUN/Creatinine Ratio 29* 28* 27* 25   Potassium 4.0 4.0 3.8 3.8   Glucose 180* 315* 152* 208*   Creatinine 2.53* 2.36* 2.24* 2.23*   GFR Estimate, Non  20 21 23 23   CALCIUM 8.4 8.4 8.3* 8.8       Imaging  XR CHEST PA OR AP 1 VIEW   Final Result       Cardiomegaly and mild pulmonary vascular congestion.         Electronically Signed by: JEREMÍAS SOUTH M.D.    Signed on: 9/9/2020 11:50 AM          US VAS EXTREMITY UPPER VENOUS DUPLEX   Final Result      No evidence of acute DVT in the left upper extremity. This is a limited study.      Electronically Signed by: PAVEL AQUINO M.D.    Signed on: 9/8/2020 2:45 PM          US VAS EXTREMITY LOWER VENOUS DUPLEX   Final Result   No clear evidence for acute deep venous thrombosis within the left lower extremity. Suboptimal study.         Study was reviewed and interpreted with Dr. SUSANNE Aquino.       Nick Hawthorne MD,    Cardiology Fellow      Dictated by: NICK HAWTHORNE MD   Dictated on: 9/3/2020 11:47 AM       IPAVEL M.D., have reviewed the images and report and concur with these findings interpreted by NICK HAWTHORNE MD.      Electronically Signed by: PAVEL AQUINO M.D.    Signed on: 9/3/2020 12:15 PM          CT HEAD WO CONTRAST   Final Result      Mild diffuse atrophy.      Senescent or small vessel ischemic changes in the deep white matter.      No significant interval change.         Electronically Signed by: JOLENE JORDAN M.D.    Signed on: 9/2/2020 11:45 PM          XR CHEST PA OR AP 1 VIEW   Final Result   1. No significant change.      Electronically Signed by: JOSE ANGEL ROSENBAUM M.D.    Signed on: 9/2/2020 2:22 PM          XR CHEST PA OR AP 1 VIEW   Final Result   1.   Cardiomegaly with pulmonary vascular congestion and mild interstitial pulmonary edema.   2.   Mild bibasilar airspace opacities, with minimal bilateral pleural effusions.      Electronically Signed by: JUDITH MURRAY M.D.     Signed on: 8/31/2020 4:35 PM          [facility] EXTREMITY UPPER VENOUS DUPLEX    (Results Pending)       Assessment   62 year old female with multiple comorbidities:   1. Hypoxic/hypercapnic respiratory failure: COPD, CHF exacerbation resolved with diuresis and BiPAP  2. Cardiorenal syndrome: with worsening EF now 10-15%  3. cardiomyopathy  4. Anasarca d/t above       Plan  Patient does not have capacity to make complex medical decisions  Attempted for the third time to contact the brother/HCPOA. Three times messages have been left.  Awaiting call back.    Total combined time for today's Face to Face encounter was 25 minutes, with > 50% of that time spent counseling and coordinating care regarding the above.     Thank you for involving Palliative and Supportive Care.  Please contact the covering provider via [employer] with further questions or concerns.    Lanie Engle DO  Palliative Care Physician  9/10/2020 11:32 AM     Yes

## 2022-05-17 NOTE — ED ADULT NURSE NOTE - OBJECTIVE STATEMENT
Patient c/o experiencing seasonal allergy s/s today. Patient went to UC for medication, however, UC sent to the wrong pharmacy and patient had a flare-up. Patient asked for medication refill.

## 2022-05-17 NOTE — ED PROVIDER NOTE - PRINCIPAL DIAGNOSIS
This condition has been fully explained to the patient, who indicates understanding.   Treatment plan:Rehab stretches/ exercises to begin immediately and given in writing with illustrations.   rest the arm as much as practical;  use of heat and/or ice may be helpful prn; NSAID's given as trial today; consider steroid injection. If not improved call as needed.    
Allergy

## 2022-05-17 NOTE — ED ADULT NURSE NOTE - NSIMPLEMENTINTERV_GEN_ALL_ED
Implemented All Universal Safety Interventions:  [place] to call system. Call bell, personal items and telephone within reach. Instruct patient to call for assistance. Room bathroom lighting operational. Non-slip footwear when patient is off stretcher. Physically safe environment: no spills, clutter or unnecessary equipment. Stretcher in lowest position, wheels locked, appropriate side rails in place.

## 2022-05-17 NOTE — ED PROVIDER NOTE - NSFOLLOWUPCLINICS_GEN_ALL_ED_FT
[facility]  Medicine  [address], [place]   Phone: [phone]  Fax:   Follow Up Time: 1-3 Days

## 2022-05-18 ENCOUNTER — NON-APPOINTMENT (OUTPATIENT)
Age: 32
End: 2022-05-18

## 2022-05-20 ENCOUNTER — NON-APPOINTMENT (OUTPATIENT)
Age: 32
End: 2022-05-20

## 2022-06-11 ENCOUNTER — NON-APPOINTMENT (OUTPATIENT)
Age: 32
End: 2022-06-11

## 2022-07-16 ENCOUNTER — EMERGENCY (EMERGENCY)
Facility: HOSPITAL | Age: 32
LOS: 0 days | Discharge: HOME | End: 2022-07-16
Attending: EMERGENCY MEDICINE | Admitting: EMERGENCY MEDICINE

## 2022-07-16 VITALS
DIASTOLIC BLOOD PRESSURE: 86 MMHG | TEMPERATURE: 97 F | HEART RATE: 91 BPM | RESPIRATION RATE: 18 BRPM | OXYGEN SATURATION: 99 % | WEIGHT: 246.04 LBS | HEIGHT: 68 IN | SYSTOLIC BLOOD PRESSURE: 129 MMHG

## 2022-07-16 DIAGNOSIS — Z88.6 ALLERGY STATUS TO ANALGESIC AGENT: ICD-10-CM

## 2022-07-16 DIAGNOSIS — Z20.822 CONTACT WITH AND (SUSPECTED) EXPOSURE TO COVID-19: ICD-10-CM

## 2022-07-16 DIAGNOSIS — Z88.8 ALLERGY STATUS TO OTHER DRUGS, MEDICAMENTS AND BIOLOGICAL SUBSTANCES STATUS: ICD-10-CM

## 2022-07-16 DIAGNOSIS — J45.909 UNSPECIFIED ASTHMA, UNCOMPLICATED: ICD-10-CM

## 2022-07-16 PROCEDURE — 99282 EMERGENCY DEPT VISIT SF MDM: CPT

## 2022-07-16 NOTE — ED PROVIDER NOTE - IV ALTEPLASE DOOR HIDDEN
Please call patient to inform her that ESR, CRP, and CBC are normal. CMP essentially normal. Still waiting on Celiac studies show

## 2022-07-16 NOTE — ED PROVIDER NOTE - OBJECTIVE STATEMENT
31-year-old female with PMH of asthma who presents with COVID test request.  Patient had an exposure but has no complaints.  Patient denies fevers, chills, chest pain, shortness of breath, wheezing, abdominal pain, nausea, vomiting, rash.

## 2022-07-16 NOTE — ED PROVIDER NOTE - PHYSICAL EXAMINATION
CONSTITUTIONAL: in no acute distress, afebrile  SKIN: Warm, dry  HEAD: Normocephalic; atraumatic  EYES: No conjunctival injection. EOMI. PERRLA  ENT: No nasal discharge; oropharynx nonerythematous; airway clear  NECK: Supple; non tender  CARD:  Regular rate and rhythm  RESP: CTAB; No wheezes, crackles, rales or rhonchi  ABD: Soft NTND; No guarding or rebound tenderness  EXT: Normal ROM.  No clubbing or cyanosis.  No edema  NEURO: A&O x3, grossly unremarkable, no focal deficits  *Chaperone RN was used during the encounter

## 2022-07-16 NOTE — ED PROVIDER NOTE - PATIENT PORTAL LINK FT
You can access the FollowMyHealth Patient Portal offered by [facility] by registering at the following website: [email or web address]. By joining [employer]’s FollowMyHealth portal, you will also be able to view your health information using other applications (apps) compatible with our system.

## 2022-07-16 NOTE — ED ADULT TRIAGE NOTE - CHIEF COMPLAINT QUOTE
Patient needs clearance for work after + covid test 3weeks ago. Patient complaining of residual shortness of breath due to recent viral infection.

## 2022-07-16 NOTE — ED PROVIDER NOTE - NS ED ROS FT
Review of Systems:  CONSTITUTIONAL: No fever, No diaphoresis, No generalized weakness  SKIN: No rash  HEMATOLOGIC: No abnormal bleeding or bruising  EYES: No eye pain, No blurred vision  ENT: No change in hearing, No sore throat, No neck pain, No rhinorrhea, No ear pain  RESPIRATORY: No shortness of breath, No cough  CARDIAC: No chest pain, No palpitations  GI: No abdominal pain, No nausea, No vomiting, No diarrhea, No constipation  MUSCULOSKELETAL: No joint paint, No swelling, No back pain  NEUROLOGIC: No numbness, No focal weakness, No headache, No dizziness  All other systems negative, unless specified in HPI

## 2022-07-16 NOTE — ED PROVIDER NOTE - ATTENDING CONTRIBUTION TO CARE
31 F to ED for covid test that she needs to return to work.  no complaints, but had an exposure.  full exam deferred by pt.

## 2022-07-16 NOTE — ED PROVIDER NOTE - NSFOLLOWUPCLINICS_GEN_ALL_ED_FT
[facility]  Medicine  [address] [place], [place]   Phone: [phone]  Fax:   Follow Up Time: 1-3 Days

## 2022-07-17 ENCOUNTER — NON-APPOINTMENT (OUTPATIENT)
Age: 32
End: 2022-07-17

## 2022-07-17 LAB
FLUAV AG NPH QL: SIGNIFICANT CHANGE UP
FLUBV AG NPH QL: SIGNIFICANT CHANGE UP
RSV RNA NPH QL NAA+NON-PROBE: SIGNIFICANT CHANGE UP
SARS-COV-2 RNA SPEC QL NAA+PROBE: SIGNIFICANT CHANGE UP

## 2022-07-22 PROBLEM — Z00.00 ENCOUNTER FOR PREVENTIVE HEALTH EXAMINATION: Status: ACTIVE | Noted: 2022-07-22

## 2022-08-14 ENCOUNTER — EMERGENCY (EMERGENCY)
Facility: HOSPITAL | Age: 32
LOS: 0 days | Discharge: HOME | End: 2022-08-14
Attending: EMERGENCY MEDICINE | Admitting: EMERGENCY MEDICINE

## 2022-08-14 VITALS
SYSTOLIC BLOOD PRESSURE: 137 MMHG | DIASTOLIC BLOOD PRESSURE: 96 MMHG | HEIGHT: 68 IN | WEIGHT: 241.63 LBS | OXYGEN SATURATION: 96 % | HEART RATE: 85 BPM | RESPIRATION RATE: 18 BRPM | TEMPERATURE: 99 F

## 2022-08-14 DIAGNOSIS — Z88.8 ALLERGY STATUS TO OTHER DRUGS, MEDICAMENTS AND BIOLOGICAL SUBSTANCES STATUS: ICD-10-CM

## 2022-08-14 DIAGNOSIS — Z88.6 ALLERGY STATUS TO ANALGESIC AGENT: ICD-10-CM

## 2022-08-14 DIAGNOSIS — L25.9 UNSPECIFIED CONTACT DERMATITIS, UNSPECIFIED CAUSE: ICD-10-CM

## 2022-08-14 PROCEDURE — 99283 EMERGENCY DEPT VISIT LOW MDM: CPT

## 2022-08-14 RX ORDER — CEPHALEXIN 500 MG
1 CAPSULE ORAL
Qty: 28 | Refills: 0
Start: 2022-08-14 | End: 2022-08-20

## 2022-08-14 RX ORDER — SACCHAROMYCES BOULARDII 250 MG
1 POWDER IN PACKET (EA) ORAL
Qty: 20 | Refills: 0
Start: 2022-08-14 | End: 2022-08-23

## 2022-08-14 NOTE — ED PROVIDER NOTE - OBJECTIVE STATEMENT
Patient is a 31-year-old female with no medical history here for evaluation of irritation to her inguinal region status post wax.  That was performed yesterday.  Patient started on irritation today which is what prompted visit.  Patient has had such irritation in the past after a waxing and states she was started on antibiotic and probiotic which helped.  Patient denies fever, chills

## 2022-08-14 NOTE — ED PROVIDER NOTE - CLINICAL SUMMARY MEDICAL DECISION MAKING FREE TEXT BOX
30 yo woman with irritation to inguinal area after recent waxing.  Infectious vs. contact dermatitis.  Will treat woith antibiotics but also recommend topical hydrocortisone.

## 2022-08-14 NOTE — ED PROVIDER NOTE - PATIENT PORTAL LINK FT
You can access the FollowMyHealth Patient Portal offered by [facility] by registering at the following website: [email or web address]. By joining [employer]’s FollowMyHealth portal, you will also be able to view your health information using other applications (apps) compatible with our system.

## 2022-08-14 NOTE — ED PROVIDER NOTE - PHYSICAL EXAMINATION
VITAL SIGNS: I have reviewed nursing notes and confirm.  CONSTITUTIONAL: Well-developed; well-nourished; in no acute distress.   SKIN: Irritation to bilateral inguinal region, no fluctuance or induration present.  No draining lesions. Remainder of skin exam is warm and dry, no acute rash.    HEAD: Normocephalic; atraumatic.  EYES: conjunctiva and sclera clear.  EXT: Normal ROM.  No clubbing, cyanosis or edema.   NEURO: Alert, oriented, grossly unremarkable

## 2022-09-13 ENCOUNTER — EMERGENCY (EMERGENCY)
Facility: HOSPITAL | Age: 32
LOS: 0 days | Discharge: LEFT BEFORE DISCHARGE | End: 2022-09-13
Attending: EMERGENCY MEDICINE | Admitting: EMERGENCY MEDICINE

## 2022-09-13 VITALS
OXYGEN SATURATION: 100 % | HEIGHT: 68 IN | TEMPERATURE: 98 F | SYSTOLIC BLOOD PRESSURE: 110 MMHG | DIASTOLIC BLOOD PRESSURE: 64 MMHG | HEART RATE: 85 BPM | RESPIRATION RATE: 17 BRPM

## 2022-09-13 VITALS
TEMPERATURE: 99 F | DIASTOLIC BLOOD PRESSURE: 69 MMHG | SYSTOLIC BLOOD PRESSURE: 129 MMHG | RESPIRATION RATE: 18 BRPM | HEART RATE: 86 BPM | OXYGEN SATURATION: 100 %

## 2022-09-13 DIAGNOSIS — Z32.01 ENCOUNTER FOR PREGNANCY TEST, RESULT POSITIVE: ICD-10-CM

## 2022-09-13 DIAGNOSIS — R10.32 LEFT LOWER QUADRANT PAIN: ICD-10-CM

## 2022-09-13 DIAGNOSIS — R10.30 LOWER ABDOMINAL PAIN, UNSPECIFIED: ICD-10-CM

## 2022-09-13 DIAGNOSIS — N64.4 MASTODYNIA: ICD-10-CM

## 2022-09-13 DIAGNOSIS — R10.31 RIGHT LOWER QUADRANT PAIN: ICD-10-CM

## 2022-09-13 DIAGNOSIS — R10.2 PELVIC AND PERINEAL PAIN: ICD-10-CM

## 2022-09-13 DIAGNOSIS — R11.0 NAUSEA: ICD-10-CM

## 2022-09-13 DIAGNOSIS — Z88.8 ALLERGY STATUS TO OTHER DRUGS, MEDICAMENTS AND BIOLOGICAL SUBSTANCES STATUS: ICD-10-CM

## 2022-09-13 LAB
APPEARANCE UR: CLEAR — SIGNIFICANT CHANGE UP
BACTERIA # UR AUTO: ABNORMAL
BILIRUB UR-MCNC: NEGATIVE — SIGNIFICANT CHANGE UP
COLOR SPEC: YELLOW — SIGNIFICANT CHANGE UP
DIFF PNL FLD: NEGATIVE — SIGNIFICANT CHANGE UP
EPI CELLS # UR: 8 /HPF — HIGH (ref 0–5)
GLUCOSE UR QL: NEGATIVE — SIGNIFICANT CHANGE UP
HCG SERPL-ACNC: 110.6 MIU/ML — HIGH
HYALINE CASTS # UR AUTO: 4 /LPF — SIGNIFICANT CHANGE UP (ref 0–7)
KETONES UR-MCNC: NEGATIVE — SIGNIFICANT CHANGE UP
LEUKOCYTE ESTERASE UR-ACNC: ABNORMAL
NITRITE UR-MCNC: NEGATIVE — SIGNIFICANT CHANGE UP
PH UR: 6 — SIGNIFICANT CHANGE UP (ref 5–8)
PROT UR-MCNC: SIGNIFICANT CHANGE UP
RBC CASTS # UR COMP ASSIST: 3 /HPF — SIGNIFICANT CHANGE UP (ref 0–4)
SP GR SPEC: 1.03 — SIGNIFICANT CHANGE UP (ref 1.01–1.03)
UROBILINOGEN FLD QL: ABNORMAL
WBC UR QL: 5 /HPF — SIGNIFICANT CHANGE UP (ref 0–5)

## 2022-09-13 PROCEDURE — 99284 EMERGENCY DEPT VISIT MOD MDM: CPT

## 2022-09-13 NOTE — ED PROVIDER NOTE - NSFOLLOWUPINSTRUCTIONS_ED_ALL_ED_FT
[facility]. Because you chose not to stay for the exam to rule out ectopic pregnancy, it is very important you follow up right away.    Our Emergency Department Referral Coordinators will be reaching out ot you in the next 24-48 hours from 9:00am to 5:00pm (Monday to Friday) with a follow up appointment. Please expect a phone call from the hospital in that time frame. If you do not receive a call or if you have any questions or concerns, you can reach them at [phone] or [phone].        First Trimester of Pregnancy       The first trimester of pregnancy starts on the first day of your last menstrual period until the end of week 12. This is months 1 through 3 of pregnancy. A week after a sperm fertilizes an egg, the egg will implant into the wall of the uterus and begin to develop into a baby. By the end of 12 weeks, all the baby's organs will be formed and the baby will be 2–3 inches in size.      Body changes during your first trimester    Your body goes through many changes during pregnancy. The changes vary and generally return to normal after your baby is born.    Physical changes     •You may gain or lose weight.      •Your breasts may begin to grow larger and become tender. The tissue that surrounds your nipples (areola) may become darker.      •Dark spots or blotches (chloasma or mask of pregnancy) may develop on your face.      •You may have changes in your hair. These can include thickening or thinning of your hair or changes in texture.      Health changes     •You may feel nauseous, and you may vomit.      •You may have heartburn.      •You may develop headaches.      •You may develop constipation.      •Your gums may bleed and may be sensitive to brushing and flossing.      Other changes     •You may tire easily.      •You may urinate more often.      •Your menstrual periods will stop.      •You may have a loss of appetite.      •You may develop cravings for certain kinds of food.      •You may have changes in your emotions from day to day.      •You may have more vivid and strange dreams.        Follow these instructions at home:    Medicines     •Follow your health care provider's instructions regarding medicine use. Specific medicines may be either safe or unsafe to take during pregnancy. Do not take any medicines unless told to by your health care provider.      •Take a prenatal vitamin that contains at least 600 micrograms (mcg) of folic acid.      Eating and drinking     •Eat a healthy diet that includes fresh fruits and vegetables, whole grains, good sources of protein such as meat, eggs, or tofu, and low-fat dairy products.      •Avoid raw meat and unpasteurized juice, milk, and cheese. These carry germs that can harm you and your baby.    •If you feel nauseous or you vomit:  •Eat 4 or 5 small meals a day instead of 3 large meals.      •Try eating a few soda crackers.      •Drink liquids between meals instead of during meals.      •You may need to take these actions to prevent or treat constipation:   •Drink enough fluid to keep your urine pale yellow.       •Eat foods that are high in fiber, such as beans, whole grains, and fresh fruits and vegetables.       •Limit foods that are high in fat and processed sugars, such as fried or sweet foods.        Activity     •Exercise only as directed by your health care provider. Most people can continue their usual exercise routine during pregnancy. Try to exercise for 30 minutes at least 5 days a week.      •Stop exercising if you develop pain or cramping in the lower abdomen or lower back.      •Avoid exercising if it is very hot or humid or if you are at high altitude.      •Avoid heavy lifting.      •If you choose to, you may have sex unless your health care provider tells you not to.      Relieving pain and discomfort     •Wear a good support bra to relieve breast tenderness.      •Rest with your legs elevated if you have leg cramps or low back pain.    •If you develop bulging veins (varicose veins) in your legs:  •Wear support hose as told by your health care provider.      •Elevate your feet for 15 minutes, 3–4 times a day.       •Limit salt in your diet.        Safety     •Wear your seat belt at all times when driving or riding in a car.      •Talk with your health care provider if someone is verbally or physically abusive to you.      •Talk with your health care provider if you are feeling sad or have thoughts of hurting yourself.      Lifestyle     • Do not use hot tubs, steam rooms, or saunas.      • Do not douche. Do not use tampons or scented sanitary pads.      • Do not use herbal remedies, alcohol, illegal drugs, or medicines that are not approved by your health care provider. Chemicals in these products can harm your baby.      • Do not use any products that contain nicotine or tobacco, such as cigarettes, e-cigarettes, and chewing tobacco. If you need help quitting, ask your health care provider.      •Avoid cat litter boxes and soil used by cats. These carry germs that can cause birth defects in the baby and possibly loss of the unborn baby (fetus) by miscarriage or stillbirth.      General instructions     •During routine prenatal visits in the first trimester, your health care provider will do a physical exam, perform necessary tests, and ask you how things are going. Keep all follow-up visits. This is important.      •Ask for help if you have counseling or nutritional needs during pregnancy. Your health care provider can offer advice or refer you to specialists for help with various needs.      •Schedule a dentist appointment. At home, brush your teeth with a soft toothbrush. Floss gently.      •Write down your questions. Take them to your prenatal visits.        Where to find more information    •American Pregnancy Association: americanpregnancy.org      •American College of Obstetricians and Gynecologists: acog.org/en/Womens%20Health/Pregnancy      •Office on Women's Health: womenshealth.gov/pregnancy        Contact a health care provider if you have:    •Dizziness.      •A fever.      •Mild pelvic cramps, pelvic pressure, or nagging pain in the abdominal area.      •Nausea, vomiting, or diarrhea that lasts for 24 hours or longer.      •A bad-smelling vaginal discharge.      •Pain when you urinate.      •Known exposure to a contagious illness, such as chickenpox, measles, Zika virus, HIV, or hepatitis.        Get help right away if you have:    •Spotting or bleeding from your vagina.      •Severe abdominal cramping or pain.      •Shortness of breath or chest pain.      •Any kind of trauma, such as from a fall or a car crash.      •New or increased pain, swelling, or redness in an arm or leg.        Summary    •The first trimester of pregnancy starts on the first day of your last menstrual period until the end of week 12 (months 1 through 3).      •Eating 4 or 5 small meals a day rather than 3 large meals may help to relieve nausea and vomiting.      • Do not use any products that contain nicotine or tobacco, such as cigarettes, e-cigarettes, and chewing tobacco. If you need help quitting, ask your health care provider.      •Keep all follow-up visits. This is important.      This information is not intended to replace advice given to you by your health care provider. Make sure you discuss any questions you have with your health care provider.

## 2022-09-13 NOTE — ED PROVIDER NOTE - PATIENT PORTAL LINK FT
You can access the FollowMyHealth Patient Portal offered by [facility] by registering at the following website: [email or web address]. By joining [employer]’s FollowMyHealth portal, you will also be able to view your health information using other applications (apps) compatible with our system.

## 2022-09-13 NOTE — ED PROVIDER NOTE - PHYSICAL EXAMINATION
CONSTITUTIONAL: Well-developed; well-nourished; in no acute distress.   SKIN: Warm, dry  HEAD: Normocephalic; atraumatic  EYES: PERRL, EOMI, normal sclera and conjunctiva   ENT: No nasal discharge; airway clear.  CARD:  Regular rate and rhythm. Normal S1, S2. 2+ radial pulses.  RESP: No increased WOB. CTA b/l   ABD: Normoactive BS. Soft, nontender, nondistended.  EXT: Normal ROM. Normal gait.  NEURO: Alert, oriented, grossly unremarkable  PSYCH: Cooperative, appropriate.

## 2022-09-13 NOTE — ED PROVIDER NOTE - CLINICAL SUMMARY MEDICAL DECISION MAKING FREE TEXT BOX
HCG neg, hcg 110, plan was for pelvic sono, but pt did not want to wait in ER for sono/labs.  pt told she needed repeat hcg in 2 days and [facility].  told to return to ER for increased pain, and VB, dizziness, syncope, or any other new/concerning symptoms.  pt understands and agrees with plan.  pt told she can return to ER at any time to continue her evaluation.

## 2022-09-13 NOTE — ED PROVIDER NOTE - NS ED ROS FT
Constitutional: No fever  Eyes:  No visual changes, eye pain, or discharge.  ENMT:  No hearing changes, earpain, sore throat, runny nose, or difficulty swallowing  Cardiac:  No chest pain, SOB or edema. No chest pain with exertion.  Respiratory:  No cough or respiratory distress.   GI:  +abd cramping, nausea. No vomiting, diarrhea   :  No dysuria, frequency or burning.  MS:  No myalgia, muscle weakness, joint pain or back pain.  Neuro:  No headache or weakness.  No LOC.  Skin:  No skin rash.

## 2022-09-13 NOTE — ED PROVIDER NOTE - ATTENDING CONTRIBUTION TO CARE
30 y/o female with no sig pmhx in ER with c/o lower abd pain/pelvic cramping, L>R.  +N.  no v/d.  no urinary complaints.  LMP 8/10/2022, not sure if she's pregnant.  no upper abd pain.  no back pain.  no cp/sob.  no f/c.  no ha/dizziness/loc.  PE - nad, nc/at, eomi, perrl, op - clear, mmm, neck supple, cta b/l, no w/w/r, rrr, abd - soft, mild suprapubic tenderness, nabs, from x 4, no LE swelling/tenderness, A&O x 3, no focal neuro deficits.  -check labs, ua, pelvic sono

## 2022-09-13 NOTE — ED PROVIDER NOTE - NSFOLLOWUPCLINICS_GEN_ALL_ED_FT
[facility]  OB/GYN  [address] [place] [address]  Phone: [phone]  Fax:   Follow Up Time: 1-3 Days

## 2022-09-13 NOTE — ED PROVIDER NOTE - OBJECTIVE STATEMENT
32 y/o F with no PMH presenting for concern for pregnancy. Pt states for past week she has had b/l breast tenderness, lower abdominal cramping, nausea but no vomiting. States she had similar symptoms at onset of last pregnancy. LMP . ; miscarried last year. Sexually active with 1 male, not concerned for STI testing.

## 2022-09-15 LAB
CULTURE RESULTS: SIGNIFICANT CHANGE UP
SPECIMEN SOURCE: SIGNIFICANT CHANGE UP

## 2022-09-20 ENCOUNTER — APPOINTMENT (OUTPATIENT)
Dept: OBGYN | Facility: CLINIC | Age: 32
End: 2022-09-20

## 2022-09-20 ENCOUNTER — OUTPATIENT (OUTPATIENT)
Dept: OUTPATIENT SERVICES | Facility: HOSPITAL | Age: 32
LOS: 1 days | Discharge: HOME | End: 2022-09-20

## 2022-09-20 DIAGNOSIS — J45.20 MILD INTERMITTENT ASTHMA, UNCOMPLICATED: ICD-10-CM

## 2022-09-20 PROCEDURE — 99213 OFFICE O/P EST LOW 20 MIN: CPT | Mod: TH

## 2022-09-20 PROCEDURE — 76856 US EXAM PELVIC COMPLETE: CPT | Mod: 26

## 2022-09-24 NOTE — ED PROVIDER NOTE - PHYSICAL EXAMINATION
27-Sep-2021 CONSTITUTIONAL: Well-developed; well-nourished; in no acute distress.   SKIN: warm, dry  HEAD: Normocephalic; atraumatic.  EYES: no conjunctival injection. PERRL.   ENT: No nasal discharge; airway clear.  NECK: Supple; non tender.  CARD: S1, S2 normal; no murmurs, gallops, or rubs. Regular rate and rhythm.   RESP: No wheezes, rales or rhonchi.  ABD: soft ntnd  EXT: Normal ROM.  No clubbing, cyanosis or edema.   LYMPH: No acute cervical adenopathy.  NEURO: Alert, oriented, grossly unremarkable  PSYCH: Cooperative, appropriate.

## 2022-09-27 ENCOUNTER — APPOINTMENT (OUTPATIENT)
Dept: OBGYN | Facility: CLINIC | Age: 32
End: 2022-09-27

## 2022-09-28 NOTE — PLAN
[FreeTextEntry1] : 32 y/o  at 5w6d based on LMP, with likely early IUP, \par - counseled patient on u/s findings. Likely represents an early IUP, however can not rule out ectopic pregnancy. Strict ectopic precautions given\par - f/u in 1 week to repeat sonogram

## 2022-09-28 NOTE — HISTORY OF PRESENT ILLNESS
[FreeTextEntry1] : 30 y/o  at 5w6d based on LMP 8/10, presents for confirmation of pregnancy. Patient went to [facility] ED on  for nausea, and increased fatigued, similarly to previous pregnancies, had elevated HCG of 110.6, however did not get an u/s due to long wait, patient decided to follow up outpatient. This is a desired pregnancy. Currently denies any nausea, vomiting, abdominal cramping or vaginal bleeding. She is a h/o asthma, uses albuterol PRN (once a yr), no h/o intubations. \par \par P1: 2004 FT , uncomplicated, M, 6-5lbs\par P2: 10/26/2010 Twin pregnancy, CS for "post dates", M/M 5-4lbs and 5-1lbs\par SABx3 \par iTOPx4 D&Cx4

## 2022-09-28 NOTE — PROCEDURE
[Transvaginal OB Sonogram] : Transvaginal OB Sonogram [FreeTextEntry1] : Subcentimeter cystic structure within the uterus likely early IUP. No adnexal masses visualized

## 2022-09-29 ENCOUNTER — OUTPATIENT (OUTPATIENT)
Dept: OUTPATIENT SERVICES | Facility: HOSPITAL | Age: 32
LOS: 1 days | Discharge: HOME | End: 2022-09-29

## 2022-09-29 ENCOUNTER — APPOINTMENT (OUTPATIENT)
Dept: OBGYN | Facility: CLINIC | Age: 32
End: 2022-09-29

## 2022-09-29 VITALS — WEIGHT: 240 LBS | SYSTOLIC BLOOD PRESSURE: 110 MMHG | DIASTOLIC BLOOD PRESSURE: 74 MMHG | HEART RATE: 72 BPM

## 2022-09-29 LAB — HCG SERPL-MCNC: 33 MIU/ML

## 2022-09-29 PROCEDURE — 76857 US EXAM PELVIC LIMITED: CPT | Mod: 26

## 2022-09-29 PROCEDURE — 99213 OFFICE O/P EST LOW 20 MIN: CPT | Mod: TH

## 2022-09-29 NOTE — PLAN
[FreeTextEntry1] : TAUS: No retained gestational sac, no ongoing pregnancy, no free fluid \par \par A/P: 32yo now  with complete early pregnancy loss. \par \par Patient grieving loss of desired pregnancy. She reports this is her 2nd consecutive early pregnancy loss with her current partner. She reports that she feels distressed given feeling like she can't attempt a pregnancy with him again knowing it might not continue. \par \par We discussed options including evaluation for her and her partner, investigating causes of recurrent pregnancy loss. She reports she has a PCP and has been told she is in good health. \par She declines initiation of work up today, as she is still processing information. \par \par Given no confirmed IUP, HCG sent today \par \par Patient desires to call for either routine Gyn care or preconception evaluation, declines scheduled follow up at this time

## 2022-09-29 NOTE — PHYSICAL EXAM
[Appropriately responsive] : appropriately responsive [Alert] : alert [Soft] : soft [Non-tender] : non-tender [No Lesions] : no lesions [Oriented x3] : oriented x3

## 2022-09-29 NOTE — HISTORY OF PRESENT ILLNESS
[FreeTextEntry1] : Patient presenting today for follow up early pregnancy viability. She reports she after last visit, she experienced cramping, heavy bleeding passing clot. She did not previously feel nausea or pregnancy symptoms, does not feel them now. She is concerns that she passed the pregnancy.

## 2023-03-12 ENCOUNTER — NON-APPOINTMENT (OUTPATIENT)
Age: 33
End: 2023-03-12

## 2023-05-31 NOTE — ED PROVIDER NOTE - IV ALTEPLASE ADMIN OUTSIDE HIDDEN
NUTRITION SERVICES: BMI - Pt with BMI >40 (=Body mass index is 43.37 kg/m².), Class III obesity. Weight loss counseling not appropriate in acute care setting. RECOMMEND - If appropriate at DC please refer to outpatient nutrition services for weight management.     
show

## 2023-07-26 NOTE — ED ADULT NURSE NOTE - NS ED NURSE LEVEL OF CONSCIOUSNESS AFFECT
[FreeTextEntry1] : - Affirm obtained\par - Flagyl sent to pharmacy for clinical e/o BV\par - Vulvar hygiene reviewed\par 
Calm/Appropriate

## 2024-01-03 ENCOUNTER — NON-APPOINTMENT (OUTPATIENT)
Age: 34
End: 2024-01-03

## 2024-02-23 NOTE — ED PROVIDER NOTE - CARE PLAN
1 Called patient to go over results. Patient states that she would like to go through with the endoscopic ultrasound. Can you please place an order for this. Route back to [facility] to send letter after order has been placed   Principal Discharge DX:	Viral illness   Principal Discharge DX:	Viral illness  Secondary Diagnosis:	Syncope

## 2024-03-11 NOTE — ED PROVIDER NOTE - CHIEF COMPLAINT
Conjunctivae and eyelids appear normal,  Sclerae : White without injection 
The patient is a 31y Female complaining of rash.

## 2025-05-06 ENCOUNTER — NON-APPOINTMENT (OUTPATIENT)
Age: 35
End: 2025-05-06

## 2025-05-20 ENCOUNTER — NON-APPOINTMENT (OUTPATIENT)
Age: 35
End: 2025-05-20

## 2025-05-22 ENCOUNTER — NON-APPOINTMENT (OUTPATIENT)
Age: 35
End: 2025-05-22

## 2025-06-10 ENCOUNTER — NON-APPOINTMENT (OUTPATIENT)
Age: 35
End: 2025-06-10

## 2025-08-18 ENCOUNTER — NON-APPOINTMENT (OUTPATIENT)
Age: 35
End: 2025-08-18